# Patient Record
Sex: MALE | Race: WHITE | Employment: OTHER | URBAN - METROPOLITAN AREA
[De-identification: names, ages, dates, MRNs, and addresses within clinical notes are randomized per-mention and may not be internally consistent; named-entity substitution may affect disease eponyms.]

---

## 2017-02-28 ENCOUNTER — OFFICE VISIT (OUTPATIENT)
Dept: INTERNAL MEDICINE CLINIC | Facility: CLINIC | Age: 57
End: 2017-02-28

## 2017-02-28 ENCOUNTER — LAB ENCOUNTER (OUTPATIENT)
Dept: LAB | Age: 57
End: 2017-02-28
Attending: FAMILY MEDICINE
Payer: COMMERCIAL

## 2017-02-28 VITALS
WEIGHT: 178.75 LBS | DIASTOLIC BLOOD PRESSURE: 68 MMHG | BODY MASS INDEX: 26 KG/M2 | OXYGEN SATURATION: 99 % | TEMPERATURE: 98 F | SYSTOLIC BLOOD PRESSURE: 116 MMHG | HEART RATE: 59 BPM

## 2017-02-28 DIAGNOSIS — I10 ESSENTIAL HYPERTENSION, BENIGN: ICD-10-CM

## 2017-02-28 DIAGNOSIS — Z13.29 SCREENING FOR THYROID DISORDER: ICD-10-CM

## 2017-02-28 DIAGNOSIS — Z13.1 SCREENING FOR DIABETES MELLITUS: ICD-10-CM

## 2017-02-28 DIAGNOSIS — E55.9 VITAMIN D DEFICIENCY: ICD-10-CM

## 2017-02-28 DIAGNOSIS — I10 ESSENTIAL HYPERTENSION, BENIGN: Primary | ICD-10-CM

## 2017-02-28 DIAGNOSIS — Z12.5 SPECIAL SCREENING FOR MALIGNANT NEOPLASM OF PROSTATE: ICD-10-CM

## 2017-02-28 DIAGNOSIS — Z13.0 SCREENING FOR IRON DEFICIENCY ANEMIA: ICD-10-CM

## 2017-02-28 LAB
ALBUMIN SERPL-MCNC: 3.8 G/DL (ref 3.5–4.8)
ALP LIVER SERPL-CCNC: 49 U/L (ref 45–117)
ALT SERPL-CCNC: 29 U/L (ref 17–63)
AST SERPL-CCNC: 20 U/L (ref 15–41)
BASOPHILS # BLD AUTO: 0.02 X10(3) UL (ref 0–0.1)
BASOPHILS NFR BLD AUTO: 0.3 %
BILIRUB SERPL-MCNC: 0.5 MG/DL (ref 0.1–2)
BUN BLD-MCNC: 13 MG/DL (ref 8–20)
CALCIUM BLD-MCNC: 9.2 MG/DL (ref 8.3–10.3)
CHLORIDE: 103 MMOL/L (ref 101–111)
CHOLEST SMN-MCNC: 166 MG/DL (ref ?–200)
CO2: 29 MMOL/L (ref 22–32)
CREAT BLD-MCNC: 1.15 MG/DL (ref 0.7–1.3)
EOSINOPHIL # BLD AUTO: 0.07 X10(3) UL (ref 0–0.3)
EOSINOPHIL NFR BLD AUTO: 1.2 %
ERYTHROCYTE [DISTWIDTH] IN BLOOD BY AUTOMATED COUNT: 11.7 % (ref 11.5–16)
GLUCOSE BLD-MCNC: 89 MG/DL (ref 70–99)
HCT VFR BLD AUTO: 42.9 % (ref 37–53)
HDLC SERPL-MCNC: 78 MG/DL (ref 45–?)
HDLC SERPL: 2.13 {RATIO} (ref ?–4.97)
HGB BLD-MCNC: 14.7 G/DL (ref 13–17)
IMMATURE GRANULOCYTE COUNT: 0.01 X10(3) UL (ref 0–1)
IMMATURE GRANULOCYTE RATIO %: 0.2 %
LDLC SERPL CALC-MCNC: 77 MG/DL (ref ?–130)
LYMPHOCYTES # BLD AUTO: 1.57 X10(3) UL (ref 0.9–4)
LYMPHOCYTES NFR BLD AUTO: 27.4 %
M PROTEIN MFR SERPL ELPH: 7.6 G/DL (ref 6.1–8.3)
MCH RBC QN AUTO: 34.3 PG (ref 27–33.2)
MCHC RBC AUTO-ENTMCNC: 34.3 G/DL (ref 31–37)
MCV RBC AUTO: 100 FL (ref 80–99)
MONOCYTES # BLD AUTO: 0.45 X10(3) UL (ref 0.1–0.6)
MONOCYTES NFR BLD AUTO: 7.9 %
NEUTROPHIL ABS PRELIM: 3.61 X10 (3) UL (ref 1.3–6.7)
NEUTROPHILS # BLD AUTO: 3.61 X10(3) UL (ref 1.3–6.7)
NEUTROPHILS NFR BLD AUTO: 63 %
NONHDLC SERPL-MCNC: 88 MG/DL (ref ?–130)
PLATELET # BLD AUTO: 223 10(3)UL (ref 150–450)
POTASSIUM SERPL-SCNC: 4.6 MMOL/L (ref 3.6–5.1)
PSA SERPL-MCNC: 1.85 NG/ML (ref 0.01–4)
RBC # BLD AUTO: 4.29 X10(6)UL (ref 4.3–5.7)
RED CELL DISTRIBUTION WIDTH-SD: 42.5 FL (ref 35.1–46.3)
SODIUM SERPL-SCNC: 138 MMOL/L (ref 136–144)
TRIGLYCERIDES: 54 MG/DL (ref ?–150)
TSI SER-ACNC: 0.52 MIU/ML (ref 0.35–5.5)
VLDL: 11 MG/DL (ref 5–40)
WBC # BLD AUTO: 5.7 X10(3) UL (ref 4–13)

## 2017-02-28 PROCEDURE — 80053 COMPREHEN METABOLIC PANEL: CPT

## 2017-02-28 PROCEDURE — 82306 VITAMIN D 25 HYDROXY: CPT

## 2017-02-28 PROCEDURE — 36415 COLL VENOUS BLD VENIPUNCTURE: CPT

## 2017-02-28 PROCEDURE — 84443 ASSAY THYROID STIM HORMONE: CPT

## 2017-02-28 PROCEDURE — 85025 COMPLETE CBC W/AUTO DIFF WBC: CPT

## 2017-02-28 PROCEDURE — 80061 LIPID PANEL: CPT

## 2017-02-28 PROCEDURE — 83036 HEMOGLOBIN GLYCOSYLATED A1C: CPT

## 2017-02-28 PROCEDURE — 99213 OFFICE O/P EST LOW 20 MIN: CPT | Performed by: FAMILY MEDICINE

## 2017-02-28 PROCEDURE — 84153 ASSAY OF PSA TOTAL: CPT

## 2017-02-28 RX ORDER — AMLODIPINE BESYLATE AND BENAZEPRIL HYDROCHLORIDE 10; 20 MG/1; MG/1
1 CAPSULE ORAL DAILY
Qty: 90 CAPSULE | Refills: 0 | Status: SHIPPED | OUTPATIENT
Start: 2017-02-28 | End: 2017-05-26

## 2017-02-28 NOTE — PROGRESS NOTES
CHIEF COMPLAINT:     Patient presents with:  Blood Pressure: Follow up. Lab: Due for labs. HPI:   Travis Hernandez is a 49year old male   Patient presents for recheck of his hypertension.  Pt has been taking medications as instructed, no medication side e auscultation bilaterally, no wheezes or rhonchi. Breathing is non labored.   CARDIO: RRR without murmur  EXTREMITIES: no cyanosis, clubbing or edema       ASSESSMENT AND PLAN:     Essential hypertension, benign  (primary encounter diagnosis)  Vitamin d defi

## 2017-03-01 LAB
25-HYDROXYVITAMIN D (TOTAL): 37.7 NG/ML (ref 30–100)
EST. AVERAGE GLUCOSE BLD GHB EST-MCNC: 111 MG/DL (ref 68–126)
HBA1C MFR BLD HPLC: 5.5 % (ref ?–5.7)

## 2017-05-26 ENCOUNTER — TELEPHONE (OUTPATIENT)
Dept: INTERNAL MEDICINE CLINIC | Facility: CLINIC | Age: 57
End: 2017-05-26

## 2017-05-26 RX ORDER — AMLODIPINE BESYLATE AND BENAZEPRIL HYDROCHLORIDE 10; 20 MG/1; MG/1
1 CAPSULE ORAL DAILY
Qty: 90 CAPSULE | Refills: 0 | Status: SHIPPED | OUTPATIENT
Start: 2017-05-26 | End: 2017-08-24

## 2017-08-24 RX ORDER — AMLODIPINE BESYLATE AND BENAZEPRIL HYDROCHLORIDE 10; 20 MG/1; MG/1
1 CAPSULE ORAL DAILY
Qty: 30 CAPSULE | Refills: 0 | Status: SHIPPED | OUTPATIENT
Start: 2017-08-24 | End: 2017-11-08

## 2017-11-08 ENCOUNTER — OFFICE VISIT (OUTPATIENT)
Dept: FAMILY MEDICINE CLINIC | Facility: CLINIC | Age: 57
End: 2017-11-08

## 2017-11-08 VITALS
SYSTOLIC BLOOD PRESSURE: 136 MMHG | BODY MASS INDEX: 26.07 KG/M2 | HEART RATE: 77 BPM | HEIGHT: 69 IN | OXYGEN SATURATION: 98 % | DIASTOLIC BLOOD PRESSURE: 62 MMHG | TEMPERATURE: 98 F | WEIGHT: 176 LBS | RESPIRATION RATE: 16 BRPM

## 2017-11-08 DIAGNOSIS — I10 ESSENTIAL HYPERTENSION: Primary | ICD-10-CM

## 2017-11-08 PROCEDURE — 99213 OFFICE O/P EST LOW 20 MIN: CPT | Performed by: FAMILY MEDICINE

## 2017-11-08 RX ORDER — AMLODIPINE BESYLATE AND BENAZEPRIL HYDROCHLORIDE 10; 20 MG/1; MG/1
1 CAPSULE ORAL DAILY
Qty: 90 CAPSULE | Refills: 1 | Status: SHIPPED | OUTPATIENT
Start: 2017-11-08 | End: 2018-04-23

## 2017-11-08 NOTE — PROGRESS NOTES
HPI:    Patient ID: Eliseo Sawant is a 56year old male. Hypertension   This is a chronic problem. The current episode started more than 1 year ago. The problem is unchanged. The problem is controlled.  Pertinent negatives include no anxiety, blurred vision encounter. Meds This Visit:  Signed Prescriptions Disp Refills    Amlodipine Besy-Benazepril HCl 10-20 MG Oral Cap 90 capsule 1      Sig: Take 1 capsule by mouth daily.            Imaging & Referrals:  None       #[id number]

## 2018-04-23 DIAGNOSIS — I10 ESSENTIAL HYPERTENSION: ICD-10-CM

## 2018-04-23 NOTE — TELEPHONE ENCOUNTER
Last ov was 11/8/17  Last refill amlodipine 11/8/17    .   Kidney:    Lab Results  Component Value Date   BUN 13 02/28/2017   BUN 15 12/02/2015   BUN 15 10/08/2014     Lab Results  Component Value Date   CREATSERUM 1.15 02/28/2017   CREATSERUM 1.10 12/02/20

## 2018-04-25 RX ORDER — AMLODIPINE BESYLATE AND BENAZEPRIL HYDROCHLORIDE 10; 20 MG/1; MG/1
1 CAPSULE ORAL DAILY
Qty: 90 CAPSULE | Refills: 0 | Status: SHIPPED | OUTPATIENT
Start: 2018-04-25 | End: 2018-07-23

## 2018-07-23 DIAGNOSIS — I10 ESSENTIAL HYPERTENSION: ICD-10-CM

## 2018-07-24 RX ORDER — AMLODIPINE BESYLATE AND BENAZEPRIL HYDROCHLORIDE 10; 20 MG/1; MG/1
1 CAPSULE ORAL DAILY
Qty: 90 CAPSULE | Refills: 0 | Status: SHIPPED | OUTPATIENT
Start: 2018-07-24 | End: 2018-10-24

## 2018-10-24 ENCOUNTER — OFFICE VISIT (OUTPATIENT)
Dept: FAMILY MEDICINE CLINIC | Facility: CLINIC | Age: 58
End: 2018-10-24
Payer: COMMERCIAL

## 2018-10-24 ENCOUNTER — LAB ENCOUNTER (OUTPATIENT)
Dept: LAB | Age: 58
End: 2018-10-24
Attending: FAMILY MEDICINE
Payer: COMMERCIAL

## 2018-10-24 VITALS
OXYGEN SATURATION: 98 % | HEART RATE: 66 BPM | RESPIRATION RATE: 20 BRPM | WEIGHT: 178 LBS | HEIGHT: 69 IN | TEMPERATURE: 98 F | BODY MASS INDEX: 26.36 KG/M2 | SYSTOLIC BLOOD PRESSURE: 122 MMHG | DIASTOLIC BLOOD PRESSURE: 82 MMHG

## 2018-10-24 DIAGNOSIS — I10 ESSENTIAL HYPERTENSION: ICD-10-CM

## 2018-10-24 DIAGNOSIS — Z00.00 ANNUAL PHYSICAL EXAM: Primary | ICD-10-CM

## 2018-10-24 DIAGNOSIS — Z23 NEED FOR TDAP VACCINATION: ICD-10-CM

## 2018-10-24 DIAGNOSIS — Z00.00 ANNUAL PHYSICAL EXAM: ICD-10-CM

## 2018-10-24 PROCEDURE — 36415 COLL VENOUS BLD VENIPUNCTURE: CPT | Performed by: FAMILY MEDICINE

## 2018-10-24 PROCEDURE — 80050 GENERAL HEALTH PANEL: CPT | Performed by: FAMILY MEDICINE

## 2018-10-24 PROCEDURE — 99396 PREV VISIT EST AGE 40-64: CPT | Performed by: FAMILY MEDICINE

## 2018-10-24 PROCEDURE — 80061 LIPID PANEL: CPT | Performed by: FAMILY MEDICINE

## 2018-10-24 PROCEDURE — 90715 TDAP VACCINE 7 YRS/> IM: CPT | Performed by: FAMILY MEDICINE

## 2018-10-24 PROCEDURE — 82306 VITAMIN D 25 HYDROXY: CPT | Performed by: FAMILY MEDICINE

## 2018-10-24 PROCEDURE — 84153 ASSAY OF PSA TOTAL: CPT | Performed by: FAMILY MEDICINE

## 2018-10-24 PROCEDURE — 90471 IMMUNIZATION ADMIN: CPT | Performed by: FAMILY MEDICINE

## 2018-10-24 RX ORDER — AMLODIPINE BESYLATE AND BENAZEPRIL HYDROCHLORIDE 10; 20 MG/1; MG/1
1 CAPSULE ORAL DAILY
Qty: 90 CAPSULE | Refills: 3 | Status: SHIPPED | OUTPATIENT
Start: 2018-10-24 | End: 2019-05-01

## 2018-10-24 NOTE — H&P
Suman Santana is a 66year old male who presents for a complete physical exam.   HPI:   Pt complains of nothing and requested a refill on his meds  Colonoscopy: 10/21/15    Current Outpatient Medications:  AMLODIPINE BESY-BENAZEPRIL HCL 10-20 MG Oral Cap TA kg/m²       Body mass index is 26.29 kg/m².      GENERAL: well developed, well nourished,in no apparent distress  SKIN: no rashes,no suspicious lesions  HEENT: atraumatic, normocephalic,TMs clear, posterior pharynx clear  EYES: PERRLA,conjunctiva clear  NEC REFLEX TO FREE T4; Future    2. Need for Tdap vaccination  - TETANUS, DIPHTHERIA TOXOIDS AND ACELLULAR PERTUSIS VACCINE (TDAP), >7 YEARS, IM USE    3. Essential hypertension  - Amlodipine Besy-Benazepril HCl 10-20 MG Oral Cap;  Take 1 capsule by mouth daily

## 2019-05-01 ENCOUNTER — OFFICE VISIT (OUTPATIENT)
Dept: FAMILY MEDICINE CLINIC | Facility: CLINIC | Age: 59
End: 2019-05-01
Payer: COMMERCIAL

## 2019-05-01 ENCOUNTER — APPOINTMENT (OUTPATIENT)
Dept: LAB | Age: 59
End: 2019-05-01
Attending: FAMILY MEDICINE
Payer: COMMERCIAL

## 2019-05-01 VITALS
HEART RATE: 67 BPM | RESPIRATION RATE: 20 BRPM | OXYGEN SATURATION: 98 % | TEMPERATURE: 98 F | BODY MASS INDEX: 23.43 KG/M2 | SYSTOLIC BLOOD PRESSURE: 116 MMHG | DIASTOLIC BLOOD PRESSURE: 68 MMHG | WEIGHT: 160 LBS | HEIGHT: 69.25 IN

## 2019-05-01 DIAGNOSIS — I10 ESSENTIAL HYPERTENSION: Primary | ICD-10-CM

## 2019-05-01 DIAGNOSIS — I10 ESSENTIAL HYPERTENSION: ICD-10-CM

## 2019-05-01 DIAGNOSIS — E78.2 MIXED HYPERLIPIDEMIA: ICD-10-CM

## 2019-05-01 PROCEDURE — 99213 OFFICE O/P EST LOW 20 MIN: CPT | Performed by: FAMILY MEDICINE

## 2019-05-01 PROCEDURE — 36415 COLL VENOUS BLD VENIPUNCTURE: CPT | Performed by: FAMILY MEDICINE

## 2019-05-01 PROCEDURE — 93005 ELECTROCARDIOGRAM TRACING: CPT

## 2019-05-01 PROCEDURE — 93010 ELECTROCARDIOGRAM REPORT: CPT | Performed by: INTERNAL MEDICINE

## 2019-05-01 PROCEDURE — 80061 LIPID PANEL: CPT | Performed by: FAMILY MEDICINE

## 2019-05-01 RX ORDER — AMLODIPINE BESYLATE AND BENAZEPRIL HYDROCHLORIDE 5; 20 MG/1; MG/1
1 CAPSULE ORAL DAILY
Qty: 90 CAPSULE | Refills: 1 | Status: SHIPPED | OUTPATIENT
Start: 2019-05-01 | End: 2019-10-11

## 2019-05-01 NOTE — PROGRESS NOTES
HPI:    Patient ID: Brett Palacios is a 66year old male. Hypertension   This is a chronic problem. The current episode started more than 1 year ago. The problem has been gradually improving since onset. The problem is controlled.  Pertinent negatives include No murmur heard. Pulmonary/Chest: Effort normal and breath sounds normal. No respiratory distress. He has no wheezes. He has no rales. Skin: He is not diaphoretic. Vitals reviewed.              ASSESSMENT/PLAN:   Essential hypertension  (primary enco

## 2019-09-20 ENCOUNTER — HOSPITAL ENCOUNTER (OUTPATIENT)
Dept: CT IMAGING | Facility: HOSPITAL | Age: 59
Discharge: HOME | End: 2019-09-20
Attending: FAMILY MEDICINE

## 2019-09-20 DIAGNOSIS — Z13.6 SCREENING FOR CARDIOVASCULAR CONDITION: ICD-10-CM

## 2019-09-20 NOTE — PROGRESS NOTES
Pt. Seen for Heart Scan. Preliminary Score: 137.33    BP: 120/76  Recently decreased BP meds due to losing weight.     Cholestec lab results: Fasting  TC: 191  HDL: 90  LDL: 88  T  Glucose: 91    Preliminary results and risk factors reviewed with pt

## 2019-10-11 RX ORDER — AMLODIPINE BESYLATE AND BENAZEPRIL HYDROCHLORIDE 5; 20 MG/1; MG/1
1 CAPSULE ORAL DAILY
Qty: 90 CAPSULE | Refills: 0 | Status: SHIPPED | OUTPATIENT
Start: 2019-10-11 | End: 2020-01-13

## 2020-01-13 RX ORDER — AMLODIPINE BESYLATE AND BENAZEPRIL HYDROCHLORIDE 5; 20 MG/1; MG/1
1 CAPSULE ORAL DAILY
Qty: 90 CAPSULE | Refills: 0 | Status: SHIPPED | OUTPATIENT
Start: 2020-01-13 | End: 2020-04-11

## 2020-04-11 RX ORDER — AMLODIPINE BESYLATE AND BENAZEPRIL HYDROCHLORIDE 5; 20 MG/1; MG/1
1 CAPSULE ORAL DAILY
Qty: 30 CAPSULE | Refills: 0 | Status: SHIPPED | OUTPATIENT
Start: 2020-04-11 | End: 2020-06-16

## 2020-04-11 NOTE — TELEPHONE ENCOUNTER
Med refilled. Pt overdue for blood work and visit. Please schedule OV with Dr. Olivia Goodrich in 2 months.

## 2020-06-16 ENCOUNTER — OFFICE VISIT (OUTPATIENT)
Dept: FAMILY MEDICINE CLINIC | Facility: CLINIC | Age: 60
End: 2020-06-16
Payer: COMMERCIAL

## 2020-06-16 ENCOUNTER — LAB ENCOUNTER (OUTPATIENT)
Dept: LAB | Age: 60
End: 2020-06-16
Attending: FAMILY MEDICINE
Payer: COMMERCIAL

## 2020-06-16 VITALS
SYSTOLIC BLOOD PRESSURE: 124 MMHG | DIASTOLIC BLOOD PRESSURE: 76 MMHG | BODY MASS INDEX: 24.16 KG/M2 | RESPIRATION RATE: 16 BRPM | HEIGHT: 69.25 IN | TEMPERATURE: 98 F | HEART RATE: 78 BPM | OXYGEN SATURATION: 97 % | WEIGHT: 165 LBS

## 2020-06-16 DIAGNOSIS — I10 ESSENTIAL HYPERTENSION: ICD-10-CM

## 2020-06-16 DIAGNOSIS — R97.20 ELEVATED PSA: ICD-10-CM

## 2020-06-16 DIAGNOSIS — Z00.00 ANNUAL PHYSICAL EXAM: Primary | ICD-10-CM

## 2020-06-16 DIAGNOSIS — Z00.00 ANNUAL PHYSICAL EXAM: ICD-10-CM

## 2020-06-16 DIAGNOSIS — E55.9 VITAMIN D DEFICIENCY: ICD-10-CM

## 2020-06-16 PROCEDURE — 99396 PREV VISIT EST AGE 40-64: CPT | Performed by: FAMILY MEDICINE

## 2020-06-16 PROCEDURE — 84153 ASSAY OF PSA TOTAL: CPT | Performed by: FAMILY MEDICINE

## 2020-06-16 PROCEDURE — 36415 COLL VENOUS BLD VENIPUNCTURE: CPT | Performed by: FAMILY MEDICINE

## 2020-06-16 PROCEDURE — 99212 OFFICE O/P EST SF 10 MIN: CPT | Performed by: FAMILY MEDICINE

## 2020-06-16 PROCEDURE — 80061 LIPID PANEL: CPT | Performed by: FAMILY MEDICINE

## 2020-06-16 PROCEDURE — 80050 GENERAL HEALTH PANEL: CPT | Performed by: FAMILY MEDICINE

## 2020-06-16 PROCEDURE — 82306 VITAMIN D 25 HYDROXY: CPT | Performed by: FAMILY MEDICINE

## 2020-06-16 RX ORDER — AMLODIPINE BESYLATE AND BENAZEPRIL HYDROCHLORIDE 5; 20 MG/1; MG/1
1 CAPSULE ORAL DAILY
Qty: 90 CAPSULE | Refills: 1 | Status: SHIPPED | OUTPATIENT
Start: 2020-06-16 | End: 2020-12-08

## 2020-06-18 ENCOUNTER — PATIENT MESSAGE (OUTPATIENT)
Dept: FAMILY MEDICINE CLINIC | Facility: CLINIC | Age: 60
End: 2020-06-18

## 2020-06-18 DIAGNOSIS — R97.20 ELEVATED PSA: Primary | ICD-10-CM

## 2020-06-18 NOTE — TELEPHONE ENCOUNTER
From: Kamini Has  To: Russel Savage DO  Sent: 6/18/2020 12:43 PM CDT  Subject: Test Results Question    Hi Dr Clifton Martin,  Please order a PSA screen blood test in 3 months.  If the results remain high I will make an appointment to see the urologist.  Thanks,

## 2020-06-18 NOTE — TELEPHONE ENCOUNTER
Dr. Cherylene League, patient requesting PSA in 3 months to be ordered under you, then patient will see urology. Do you want to order for 3 months?

## 2020-06-27 NOTE — H&P
Og Tucker is a 64year old male who presents for a complete physical exam.   HPI:   Pt complains of chronic htn, stable. No CP/SOB/HA. Recent PSA was elevated. .    Colonoscopy: done in 2015 due for it this year  Current Outpatient Medications   Medi 97%   BMI 24.19 kg/m²       Body mass index is 24.19 kg/m².      GENERAL: well developed, well nourished,in no apparent distress  SKIN: no rashes,no suspicious lesions  HEENT: atraumatic, normocephalic,TMs clear, posterior pharynx clear  EYES: PERRLA,conjun Standing Expiration Date: 6/16/2021      PSA (Screening) [E]          Standing Status: Future          Number of Occurrences: 1          Standing Expiration Date: 6/16/2021      Vitamin D, 25-Hydroxy [E]          Standing Status: Future          Num

## 2020-10-07 ENCOUNTER — LABORATORY ENCOUNTER (OUTPATIENT)
Dept: LAB | Age: 60
End: 2020-10-07
Attending: FAMILY MEDICINE
Payer: COMMERCIAL

## 2020-10-07 DIAGNOSIS — R97.20 ELEVATED PSA: ICD-10-CM

## 2020-10-07 PROCEDURE — 36415 COLL VENOUS BLD VENIPUNCTURE: CPT | Performed by: FAMILY MEDICINE

## 2020-10-07 PROCEDURE — 84153 ASSAY OF PSA TOTAL: CPT | Performed by: FAMILY MEDICINE

## 2020-10-19 ENCOUNTER — APPOINTMENT (OUTPATIENT)
Dept: LAB | Facility: HOSPITAL | Age: 60
End: 2020-10-19
Attending: INTERNAL MEDICINE
Payer: COMMERCIAL

## 2020-10-19 DIAGNOSIS — Z01.818 PRE-OP TESTING: ICD-10-CM

## 2020-10-21 PROBLEM — Z86.0101 HISTORY OF ADENOMATOUS POLYP OF COLON: Status: ACTIVE | Noted: 2020-10-21

## 2020-10-21 PROBLEM — D12.3 BENIGN NEOPLASM OF TRANSVERSE COLON: Status: ACTIVE | Noted: 2020-10-21

## 2020-10-21 PROBLEM — Z86.010 HISTORY OF ADENOMATOUS POLYP OF COLON: Status: ACTIVE | Noted: 2020-10-21

## 2020-12-08 RX ORDER — AMLODIPINE BESYLATE AND BENAZEPRIL HYDROCHLORIDE 5; 20 MG/1; MG/1
1 CAPSULE ORAL DAILY
Qty: 90 CAPSULE | Refills: 1 | Status: SHIPPED | OUTPATIENT
Start: 2020-12-08 | End: 2021-06-04

## 2021-06-04 RX ORDER — AMLODIPINE BESYLATE AND BENAZEPRIL HYDROCHLORIDE 5; 20 MG/1; MG/1
1 CAPSULE ORAL DAILY
Qty: 90 CAPSULE | Refills: 0 | Status: SHIPPED | OUTPATIENT
Start: 2021-06-04 | End: 2021-09-03

## 2021-09-03 RX ORDER — AMLODIPINE BESYLATE AND BENAZEPRIL HYDROCHLORIDE 5; 20 MG/1; MG/1
1 CAPSULE ORAL DAILY
Qty: 90 CAPSULE | Refills: 0 | OUTPATIENT
Start: 2021-09-03

## 2021-09-03 RX ORDER — AMLODIPINE BESYLATE AND BENAZEPRIL HYDROCHLORIDE 5; 20 MG/1; MG/1
1 CAPSULE ORAL DAILY
Qty: 90 CAPSULE | Refills: 0 | Status: SHIPPED | OUTPATIENT
Start: 2021-09-03 | End: 2021-10-06

## 2021-09-03 NOTE — TELEPHONE ENCOUNTER
LR-  6/4/21      LOV 6/8/20        Sending pt a my chart message about coming in for yearly physical

## 2021-09-09 ENCOUNTER — OFFICE VISIT (OUTPATIENT)
Dept: FAMILY MEDICINE CLINIC | Facility: CLINIC | Age: 61
End: 2021-09-09
Payer: COMMERCIAL

## 2021-09-09 VITALS
OXYGEN SATURATION: 100 % | SYSTOLIC BLOOD PRESSURE: 136 MMHG | RESPIRATION RATE: 14 BRPM | HEART RATE: 68 BPM | BODY MASS INDEX: 25.48 KG/M2 | DIASTOLIC BLOOD PRESSURE: 75 MMHG | TEMPERATURE: 98 F | HEIGHT: 69 IN | WEIGHT: 172 LBS

## 2021-09-09 DIAGNOSIS — Z11.52 ENCOUNTER FOR SCREENING FOR COVID-19: Primary | ICD-10-CM

## 2021-09-09 PROCEDURE — 3008F BODY MASS INDEX DOCD: CPT | Performed by: PHYSICIAN ASSISTANT

## 2021-09-09 PROCEDURE — 99212 OFFICE O/P EST SF 10 MIN: CPT | Performed by: PHYSICIAN ASSISTANT

## 2021-09-09 PROCEDURE — 3075F SYST BP GE 130 - 139MM HG: CPT | Performed by: PHYSICIAN ASSISTANT

## 2021-09-09 PROCEDURE — 3078F DIAST BP <80 MM HG: CPT | Performed by: PHYSICIAN ASSISTANT

## 2021-09-09 NOTE — PATIENT INSTRUCTIONS
Coronavirus Disease 2019 (COVID-19)     [facility] is committed to the safety and well-being of our patients, members, employees, and communities.  As concerns arise about the new strain of coronavirus that causes COVID-19, [facility] exposure  • After day 7 from date of last exposure with a negative test result (test must occur on day 5 or later)  After stopping quarantine, you should  • Watch for symptoms until 14 days after exposure.   • If you have symptoms, immediately self-isolate Care     If you are awaiting test results or are confirmed positive for COVID -19, and your symptoms worsen at home with symptoms such as: extreme weakness, difficult breathing, or unrelenting fevers greater than 100.4 degrees Fahrenheit, you should contac Follow-up  If you are diagnosed with COVID, refrain from exercise until approved by your primary care provider. Please call your primary care provider within 2 days of your discharge to arrange for a telehealth follow-up.  CDC does not recommend repeat test Control & Prevention (CDC)  10 things you can do to manage your health at home, Beat[employer].[email or web address]. pdf  [email or web address] Retrieved March 17, 2021, from [email or web address]-information/[email or web address]-19-long-haulers. html  Long-term effects of covid-19. (n.d.).  Retrieved May 11, 2021, from [email or web address]

## 2021-09-09 NOTE — PROGRESS NOTES
CHIEF COMPLAINT:   Patient presents with:  Testing        HPI:   Lakesha Alves is a 64year old male who presents for covid testing- going on a cruise to [place].  No symptoms, feels well         Current Outpatient Medications   Medication Sig Disp LYMPH: No cervical or supraclavicular lymphadenopathy.        ASSESSMENT AND PLAN:     Cynthia Maya is a 64year old male who presents with:     ASSESSMENT:  Encounter for screening for covid-19  (primary encounter diagnosis)  CDC guidelines discussed health authorities make the final decisions about how long quarantine should last, based on local conditions and needs. Follow the recommendations of your local public health department if you need to quarantine.  Options they will consider include stopping need to be around other people in or outside of the home, wear a facemask. 9. Avoid sharing personal items with other people in your household, like dishes, towels, and bedding   10.  Clean all surfaces that are touched often, like counters, tabletops, an getting better, whichever is longer. Patients with pending COVID-19 test results should follow all care and home isolation instructions. Your test results will be called to you from an [facility] representative.  If you have not received a call withi [facility] COVID-19 Nurse Triage Line at [phone].     Additional Information      You can also get more information at the following websites:   Centers for Disease Control & Prevention (CDC)  What to do if you are sick with coronavirus disease 20 Important ways to slow the spread of COVID 19 are:   • Get the COVID 19 vaccine and encourage others to get the COVID 19 vaccine   • Wear a mask in public  • Avoid large gatherings  • Wash your hands frequently  • Stay at least 6 feet away from other peopl

## 2021-09-10 LAB — SARS-COV-2 RNA RESP QL NAA+PROBE: NOT DETECTED

## 2021-10-06 ENCOUNTER — OFFICE VISIT (OUTPATIENT)
Dept: FAMILY MEDICINE CLINIC | Facility: CLINIC | Age: 61
End: 2021-10-06
Payer: COMMERCIAL

## 2021-10-06 ENCOUNTER — LAB ENCOUNTER (OUTPATIENT)
Dept: LAB | Age: 61
End: 2021-10-06
Attending: FAMILY MEDICINE
Payer: COMMERCIAL

## 2021-10-06 VITALS
DIASTOLIC BLOOD PRESSURE: 82 MMHG | WEIGHT: 169.38 LBS | OXYGEN SATURATION: 98 % | SYSTOLIC BLOOD PRESSURE: 120 MMHG | HEIGHT: 69 IN | BODY MASS INDEX: 25.09 KG/M2 | HEART RATE: 75 BPM | RESPIRATION RATE: 16 BRPM

## 2021-10-06 DIAGNOSIS — Z13.0 SCREENING FOR DEFICIENCY ANEMIA: ICD-10-CM

## 2021-10-06 DIAGNOSIS — Z13.220 LIPID SCREENING: ICD-10-CM

## 2021-10-06 DIAGNOSIS — Z00.00 WELLNESS EXAMINATION: Primary | ICD-10-CM

## 2021-10-06 DIAGNOSIS — Z00.00 WELLNESS EXAMINATION: ICD-10-CM

## 2021-10-06 DIAGNOSIS — Z12.5 PROSTATE CANCER SCREENING: ICD-10-CM

## 2021-10-06 DIAGNOSIS — I10 ESSENTIAL HYPERTENSION, BENIGN: ICD-10-CM

## 2021-10-06 DIAGNOSIS — Z23 NEED FOR SHINGLES VACCINE: ICD-10-CM

## 2021-10-06 PROCEDURE — 84153 ASSAY OF PSA TOTAL: CPT | Performed by: FAMILY MEDICINE

## 2021-10-06 PROCEDURE — 3008F BODY MASS INDEX DOCD: CPT | Performed by: FAMILY MEDICINE

## 2021-10-06 PROCEDURE — 90750 HZV VACC RECOMBINANT IM: CPT | Performed by: FAMILY MEDICINE

## 2021-10-06 PROCEDURE — 80053 COMPREHEN METABOLIC PANEL: CPT | Performed by: FAMILY MEDICINE

## 2021-10-06 PROCEDURE — 99396 PREV VISIT EST AGE 40-64: CPT | Performed by: FAMILY MEDICINE

## 2021-10-06 PROCEDURE — 85025 COMPLETE CBC W/AUTO DIFF WBC: CPT | Performed by: FAMILY MEDICINE

## 2021-10-06 PROCEDURE — 90471 IMMUNIZATION ADMIN: CPT | Performed by: FAMILY MEDICINE

## 2021-10-06 PROCEDURE — 3079F DIAST BP 80-89 MM HG: CPT | Performed by: FAMILY MEDICINE

## 2021-10-06 PROCEDURE — 80061 LIPID PANEL: CPT | Performed by: FAMILY MEDICINE

## 2021-10-06 PROCEDURE — 3074F SYST BP LT 130 MM HG: CPT | Performed by: FAMILY MEDICINE

## 2021-10-06 RX ORDER — CHOLECALCIFEROL (VITAMIN D3) 125 MCG
CAPSULE ORAL
COMMUNITY
Start: 2020-10-01

## 2021-10-06 RX ORDER — MULTIVIT WITH MINERALS/LUTEIN
TABLET ORAL
COMMUNITY

## 2021-10-06 RX ORDER — AMLODIPINE BESYLATE AND BENAZEPRIL HYDROCHLORIDE 5; 20 MG/1; MG/1
1 CAPSULE ORAL DAILY
Qty: 90 CAPSULE | Refills: 3 | Status: SHIPPED | OUTPATIENT
Start: 2021-10-06

## 2021-10-06 NOTE — PROGRESS NOTES
HPI:   Lakesha Alves is a 63year old male who presents for an Annual Health Visit. 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         Allergies:     Codeine                 RASH    Com (76.8 kg)   SpO2 98%   BMI 25.02 kg/m²    Wt Readings from Last 6 Encounters:  10/06/21 : 169 lb 6.4 oz (76.8 kg)  09/09/21 : 172 lb (78 kg)  10/16/20 : 165 lb (74.8 kg)  06/16/20 : 165 lb (74.8 kg)  05/01/19 : 160 lb (72.6 kg)  10/24/18 : 178 lb (80.7 kg) VACC RECOMBINANT IM NJX      Overall doing well, will get screening labs and get shingles vaccine started. There are no Patient Instructions on file for this visit. The patient indicates understanding of these issues and agrees to the plan.     Proble

## 2022-10-11 ENCOUNTER — ORDER TRANSCRIPTION (OUTPATIENT)
Dept: ADMINISTRATIVE | Facility: HOSPITAL | Age: 62
End: 2022-10-11

## 2022-10-11 DIAGNOSIS — Z13.6 SCREENING FOR CARDIOVASCULAR CONDITION: Primary | ICD-10-CM

## 2022-10-19 ENCOUNTER — LAB ENCOUNTER (OUTPATIENT)
Dept: LAB | Age: 62
End: 2022-10-19
Attending: FAMILY MEDICINE
Payer: COMMERCIAL

## 2022-10-19 ENCOUNTER — OFFICE VISIT (OUTPATIENT)
Dept: FAMILY MEDICINE CLINIC | Facility: CLINIC | Age: 62
End: 2022-10-19
Payer: COMMERCIAL

## 2022-10-19 ENCOUNTER — HOSPITAL ENCOUNTER (OUTPATIENT)
Dept: CT IMAGING | Age: 62
Discharge: HOME | End: 2022-10-19
Attending: FAMILY MEDICINE

## 2022-10-19 VITALS
SYSTOLIC BLOOD PRESSURE: 108 MMHG | OXYGEN SATURATION: 99 % | WEIGHT: 160 LBS | HEIGHT: 69.33 IN | RESPIRATION RATE: 16 BRPM | HEART RATE: 66 BPM | BODY MASS INDEX: 23.43 KG/M2 | DIASTOLIC BLOOD PRESSURE: 68 MMHG

## 2022-10-19 DIAGNOSIS — Z12.5 PROSTATE CANCER SCREENING: ICD-10-CM

## 2022-10-19 DIAGNOSIS — Z13.220 LIPID SCREENING: ICD-10-CM

## 2022-10-19 DIAGNOSIS — Z13.0 SCREENING FOR DEFICIENCY ANEMIA: ICD-10-CM

## 2022-10-19 DIAGNOSIS — Z13.6 SCREENING FOR CARDIOVASCULAR CONDITION: ICD-10-CM

## 2022-10-19 DIAGNOSIS — Z00.00 WELLNESS EXAMINATION: Primary | ICD-10-CM

## 2022-10-19 DIAGNOSIS — Z00.00 WELLNESS EXAMINATION: ICD-10-CM

## 2022-10-19 DIAGNOSIS — I10 ESSENTIAL HYPERTENSION, BENIGN: ICD-10-CM

## 2022-10-19 LAB
ALBUMIN SERPL-MCNC: 3.3 G/DL (ref 3.4–5)
ALBUMIN/GLOB SERPL: 1 {RATIO} (ref 1–2)
ALP LIVER SERPL-CCNC: 45 U/L
ALT SERPL-CCNC: 23 U/L
ANION GAP SERPL CALC-SCNC: 3 MMOL/L (ref 0–18)
AST SERPL-CCNC: 13 U/L (ref 15–37)
BASOPHILS # BLD AUTO: 0.03 X10(3) UL (ref 0–0.2)
BASOPHILS NFR BLD AUTO: 0.6 %
BILIRUB SERPL-MCNC: 0.4 MG/DL (ref 0.1–2)
BUN BLD-MCNC: 16 MG/DL (ref 7–18)
CALCIUM BLD-MCNC: 8.7 MG/DL (ref 8.5–10.1)
CHLORIDE SERPL-SCNC: 110 MMOL/L (ref 98–112)
CHOLEST SERPL-MCNC: 203 MG/DL (ref ?–200)
CO2 SERPL-SCNC: 29 MMOL/L (ref 21–32)
COMPLEXED PSA SERPL-MCNC: 5.5 NG/ML (ref ?–4)
CREAT BLD-MCNC: 0.97 MG/DL
EOSINOPHIL # BLD AUTO: 0.11 X10(3) UL (ref 0–0.7)
EOSINOPHIL NFR BLD AUTO: 2.3 %
ERYTHROCYTE [DISTWIDTH] IN BLOOD BY AUTOMATED COUNT: 11.9 %
FASTING PATIENT LIPID ANSWER: YES
FASTING STATUS PATIENT QL REPORTED: YES
GFR SERPLBLD BASED ON 1.73 SQ M-ARVRAT: 89 ML/MIN/1.73M2 (ref 60–?)
GLOBULIN PLAS-MCNC: 3.4 G/DL (ref 2.8–4.4)
GLUCOSE BLD-MCNC: 101 MG/DL (ref 70–99)
HCT VFR BLD AUTO: 40 %
HDLC SERPL-MCNC: 77 MG/DL (ref 40–59)
HGB BLD-MCNC: 13.6 G/DL
IMM GRANULOCYTES # BLD AUTO: 0.01 X10(3) UL (ref 0–1)
IMM GRANULOCYTES NFR BLD: 0.2 %
LDLC SERPL CALC-MCNC: 111 MG/DL (ref ?–100)
LYMPHOCYTES # BLD AUTO: 1.43 X10(3) UL (ref 1–4)
LYMPHOCYTES NFR BLD AUTO: 30.2 %
MCH RBC QN AUTO: 34.6 PG (ref 26–34)
MCHC RBC AUTO-ENTMCNC: 34 G/DL (ref 31–37)
MCV RBC AUTO: 101.8 FL
MONOCYTES # BLD AUTO: 0.43 X10(3) UL (ref 0.1–1)
MONOCYTES NFR BLD AUTO: 9.1 %
NEUTROPHILS # BLD AUTO: 2.72 X10 (3) UL (ref 1.5–7.7)
NEUTROPHILS # BLD AUTO: 2.72 X10(3) UL (ref 1.5–7.7)
NEUTROPHILS NFR BLD AUTO: 57.6 %
NONHDLC SERPL-MCNC: 126 MG/DL (ref ?–130)
OSMOLALITY SERPL CALC.SUM OF ELEC: 295 MOSM/KG (ref 275–295)
PLATELET # BLD AUTO: 230 10(3)UL (ref 150–450)
POTASSIUM SERPL-SCNC: 4.7 MMOL/L (ref 3.5–5.1)
PROT SERPL-MCNC: 6.7 G/DL (ref 6.4–8.2)
RBC # BLD AUTO: 3.93 X10(6)UL
SODIUM SERPL-SCNC: 142 MMOL/L (ref 136–145)
TRIGL SERPL-MCNC: 85 MG/DL (ref 30–149)
VLDLC SERPL CALC-MCNC: 14 MG/DL (ref 0–30)
WBC # BLD AUTO: 4.7 X10(3) UL (ref 4–11)

## 2022-10-19 PROCEDURE — 3078F DIAST BP <80 MM HG: CPT | Performed by: FAMILY MEDICINE

## 2022-10-19 PROCEDURE — 85025 COMPLETE CBC W/AUTO DIFF WBC: CPT

## 2022-10-19 PROCEDURE — 99396 PREV VISIT EST AGE 40-64: CPT | Performed by: FAMILY MEDICINE

## 2022-10-19 PROCEDURE — 3008F BODY MASS INDEX DOCD: CPT | Performed by: FAMILY MEDICINE

## 2022-10-19 PROCEDURE — 80061 LIPID PANEL: CPT

## 2022-10-19 PROCEDURE — 36415 COLL VENOUS BLD VENIPUNCTURE: CPT

## 2022-10-19 PROCEDURE — 3074F SYST BP LT 130 MM HG: CPT | Performed by: FAMILY MEDICINE

## 2022-10-19 PROCEDURE — 80053 COMPREHEN METABOLIC PANEL: CPT

## 2022-10-19 RX ORDER — AMLODIPINE BESYLATE AND BENAZEPRIL HYDROCHLORIDE 5; 20 MG/1; MG/1
1 CAPSULE ORAL DAILY
Qty: 90 CAPSULE | Refills: 3 | Status: SHIPPED | OUTPATIENT
Start: 2022-10-19

## 2022-10-27 DIAGNOSIS — R97.20 ELEVATED PSA: Primary | ICD-10-CM

## 2023-02-01 ENCOUNTER — LAB ENCOUNTER (OUTPATIENT)
Dept: LAB | Age: 63
End: 2023-02-01
Attending: FAMILY MEDICINE
Payer: COMMERCIAL

## 2023-02-01 DIAGNOSIS — E78.5 HYPERLIPIDEMIA, UNSPECIFIED HYPERLIPIDEMIA TYPE: ICD-10-CM

## 2023-02-01 DIAGNOSIS — R97.20 ELEVATED PSA: ICD-10-CM

## 2023-02-01 LAB
ALBUMIN SERPL-MCNC: 3.6 G/DL (ref 3.4–5)
ALBUMIN/GLOB SERPL: 1 {RATIO} (ref 1–2)
ALP LIVER SERPL-CCNC: 44 U/L
ALT SERPL-CCNC: 30 U/L
ANION GAP SERPL CALC-SCNC: 2 MMOL/L (ref 0–18)
AST SERPL-CCNC: 18 U/L (ref 15–37)
BILIRUB SERPL-MCNC: 0.5 MG/DL (ref 0.1–2)
BUN BLD-MCNC: 22 MG/DL (ref 7–18)
CALCIUM BLD-MCNC: 9.1 MG/DL (ref 8.5–10.1)
CHLORIDE SERPL-SCNC: 107 MMOL/L (ref 98–112)
CHOLEST SERPL-MCNC: 135 MG/DL (ref ?–200)
CO2 SERPL-SCNC: 31 MMOL/L (ref 21–32)
CREAT BLD-MCNC: 1.28 MG/DL
FASTING PATIENT LIPID ANSWER: YES
FASTING STATUS PATIENT QL REPORTED: YES
GFR SERPLBLD BASED ON 1.73 SQ M-ARVRAT: 64 ML/MIN/1.73M2 (ref 60–?)
GLOBULIN PLAS-MCNC: 3.5 G/DL (ref 2.8–4.4)
GLUCOSE BLD-MCNC: 94 MG/DL (ref 70–99)
HDLC SERPL-MCNC: 66 MG/DL (ref 40–59)
LDLC SERPL CALC-MCNC: 54 MG/DL (ref ?–100)
NONHDLC SERPL-MCNC: 69 MG/DL (ref ?–130)
OSMOLALITY SERPL CALC.SUM OF ELEC: 293 MOSM/KG (ref 275–295)
POTASSIUM SERPL-SCNC: 4.7 MMOL/L (ref 3.5–5.1)
PROT SERPL-MCNC: 7.1 G/DL (ref 6.4–8.2)
PSA FREE MFR SERPL: 17 %
PSA FREE SERPL-MCNC: 0.59 NG/ML
PSA SERPL-MCNC: 3.43 NG/ML (ref ?–4)
SODIUM SERPL-SCNC: 140 MMOL/L (ref 136–145)
TRIGL SERPL-MCNC: 73 MG/DL (ref 30–149)
VLDLC SERPL CALC-MCNC: 11 MG/DL (ref 0–30)

## 2023-02-01 PROCEDURE — 80061 LIPID PANEL: CPT

## 2023-02-01 PROCEDURE — 36415 COLL VENOUS BLD VENIPUNCTURE: CPT

## 2023-02-01 PROCEDURE — 84154 ASSAY OF PSA FREE: CPT

## 2023-02-01 PROCEDURE — 80053 COMPREHEN METABOLIC PANEL: CPT

## 2023-02-01 PROCEDURE — 84153 ASSAY OF PSA TOTAL: CPT

## 2023-04-24 DIAGNOSIS — E78.5 HYPERLIPIDEMIA, UNSPECIFIED HYPERLIPIDEMIA TYPE: ICD-10-CM

## 2023-04-24 RX ORDER — ATORVASTATIN CALCIUM 10 MG/1
10 TABLET, FILM COATED ORAL NIGHTLY
Qty: 90 TABLET | Refills: 0 | Status: SHIPPED | OUTPATIENT
Start: 2023-04-24

## 2023-07-25 DIAGNOSIS — E78.5 HYPERLIPIDEMIA, UNSPECIFIED HYPERLIPIDEMIA TYPE: ICD-10-CM

## 2023-07-25 RX ORDER — ATORVASTATIN CALCIUM 10 MG/1
10 TABLET, FILM COATED ORAL NIGHTLY
Qty: 90 TABLET | Refills: 0 | Status: SHIPPED | OUTPATIENT
Start: 2023-07-25

## 2023-11-08 ENCOUNTER — OFFICE VISIT (OUTPATIENT)
Dept: FAMILY MEDICINE CLINIC | Facility: CLINIC | Age: 63
End: 2023-11-08
Payer: COMMERCIAL

## 2023-11-08 ENCOUNTER — LAB ENCOUNTER (OUTPATIENT)
Dept: LAB | Age: 63
End: 2023-11-08
Attending: FAMILY MEDICINE
Payer: COMMERCIAL

## 2023-11-08 VITALS
RESPIRATION RATE: 16 BRPM | WEIGHT: 164 LBS | DIASTOLIC BLOOD PRESSURE: 82 MMHG | HEART RATE: 78 BPM | SYSTOLIC BLOOD PRESSURE: 118 MMHG | BODY MASS INDEX: 24.02 KG/M2 | HEIGHT: 69.33 IN | OXYGEN SATURATION: 98 %

## 2023-11-08 DIAGNOSIS — Z13.0 SCREENING FOR DEFICIENCY ANEMIA: ICD-10-CM

## 2023-11-08 DIAGNOSIS — Z12.5 PROSTATE CANCER SCREENING: ICD-10-CM

## 2023-11-08 DIAGNOSIS — Z00.00 WELLNESS EXAMINATION: ICD-10-CM

## 2023-11-08 DIAGNOSIS — Z13.220 LIPID SCREENING: ICD-10-CM

## 2023-11-08 DIAGNOSIS — E78.5 HYPERLIPIDEMIA, UNSPECIFIED HYPERLIPIDEMIA TYPE: ICD-10-CM

## 2023-11-08 DIAGNOSIS — I10 ESSENTIAL HYPERTENSION, BENIGN: ICD-10-CM

## 2023-11-08 DIAGNOSIS — Z00.00 WELLNESS EXAMINATION: Primary | ICD-10-CM

## 2023-11-08 LAB
ALBUMIN SERPL-MCNC: 3.4 G/DL (ref 3.4–5)
ALBUMIN/GLOB SERPL: 0.9 {RATIO} (ref 1–2)
ALP LIVER SERPL-CCNC: 44 U/L
ALT SERPL-CCNC: 25 U/L
ANION GAP SERPL CALC-SCNC: 2 MMOL/L (ref 0–18)
AST SERPL-CCNC: 16 U/L (ref 15–37)
BASOPHILS # BLD AUTO: 0.02 X10(3) UL (ref 0–0.2)
BASOPHILS NFR BLD AUTO: 0.4 %
BILIRUB SERPL-MCNC: 0.6 MG/DL (ref 0.1–2)
BUN BLD-MCNC: 26 MG/DL (ref 9–23)
CALCIUM BLD-MCNC: 9.2 MG/DL (ref 8.5–10.1)
CHLORIDE SERPL-SCNC: 109 MMOL/L (ref 98–112)
CHOLEST SERPL-MCNC: 202 MG/DL (ref ?–200)
CO2 SERPL-SCNC: 28 MMOL/L (ref 21–32)
COMPLEXED PSA SERPL-MCNC: 5.01 NG/ML (ref ?–4)
CREAT BLD-MCNC: 1.32 MG/DL
EGFRCR SERPLBLD CKD-EPI 2021: 61 ML/MIN/1.73M2 (ref 60–?)
EOSINOPHIL # BLD AUTO: 0.12 X10(3) UL (ref 0–0.7)
EOSINOPHIL NFR BLD AUTO: 2.6 %
ERYTHROCYTE [DISTWIDTH] IN BLOOD BY AUTOMATED COUNT: 11.5 %
FASTING PATIENT LIPID ANSWER: YES
FASTING STATUS PATIENT QL REPORTED: YES
GLOBULIN PLAS-MCNC: 3.7 G/DL (ref 2.8–4.4)
GLUCOSE BLD-MCNC: 97 MG/DL (ref 70–99)
HCT VFR BLD AUTO: 41.4 %
HDLC SERPL-MCNC: 68 MG/DL (ref 40–59)
HGB BLD-MCNC: 13.9 G/DL
IMM GRANULOCYTES # BLD AUTO: 0.01 X10(3) UL (ref 0–1)
IMM GRANULOCYTES NFR BLD: 0.2 %
LDLC SERPL CALC-MCNC: 117 MG/DL (ref ?–100)
LYMPHOCYTES # BLD AUTO: 1.59 X10(3) UL (ref 1–4)
LYMPHOCYTES NFR BLD AUTO: 34.4 %
MCH RBC QN AUTO: 32.7 PG (ref 26–34)
MCHC RBC AUTO-ENTMCNC: 33.6 G/DL (ref 31–37)
MCV RBC AUTO: 97.4 FL
MONOCYTES # BLD AUTO: 0.33 X10(3) UL (ref 0.1–1)
MONOCYTES NFR BLD AUTO: 7.1 %
NEUTROPHILS # BLD AUTO: 2.55 X10 (3) UL (ref 1.5–7.7)
NEUTROPHILS # BLD AUTO: 2.55 X10(3) UL (ref 1.5–7.7)
NEUTROPHILS NFR BLD AUTO: 55.3 %
NONHDLC SERPL-MCNC: 134 MG/DL (ref ?–130)
OSMOLALITY SERPL CALC.SUM OF ELEC: 293 MOSM/KG (ref 275–295)
PLATELET # BLD AUTO: 224 10(3)UL (ref 150–450)
POTASSIUM SERPL-SCNC: 4.5 MMOL/L (ref 3.5–5.1)
PROT SERPL-MCNC: 7.1 G/DL (ref 6.4–8.2)
RBC # BLD AUTO: 4.25 X10(6)UL
SODIUM SERPL-SCNC: 139 MMOL/L (ref 136–145)
TRIGL SERPL-MCNC: 93 MG/DL (ref 30–149)
VLDLC SERPL CALC-MCNC: 16 MG/DL (ref 0–30)
WBC # BLD AUTO: 4.6 X10(3) UL (ref 4–11)

## 2023-11-08 PROCEDURE — 3008F BODY MASS INDEX DOCD: CPT | Performed by: FAMILY MEDICINE

## 2023-11-08 PROCEDURE — 99396 PREV VISIT EST AGE 40-64: CPT | Performed by: FAMILY MEDICINE

## 2023-11-08 PROCEDURE — 80053 COMPREHEN METABOLIC PANEL: CPT

## 2023-11-08 PROCEDURE — 36415 COLL VENOUS BLD VENIPUNCTURE: CPT

## 2023-11-08 PROCEDURE — 80061 LIPID PANEL: CPT

## 2023-11-08 PROCEDURE — 3079F DIAST BP 80-89 MM HG: CPT | Performed by: FAMILY MEDICINE

## 2023-11-08 PROCEDURE — 85025 COMPLETE CBC W/AUTO DIFF WBC: CPT

## 2023-11-08 PROCEDURE — 3074F SYST BP LT 130 MM HG: CPT | Performed by: FAMILY MEDICINE

## 2023-11-08 RX ORDER — AMLODIPINE BESYLATE AND BENAZEPRIL HYDROCHLORIDE 5; 20 MG/1; MG/1
1 CAPSULE ORAL DAILY
Qty: 90 CAPSULE | Refills: 3 | Status: SHIPPED | OUTPATIENT
Start: 2023-11-08

## 2023-11-08 RX ORDER — PRAVASTATIN SODIUM 20 MG
20 TABLET ORAL NIGHTLY
Qty: 90 TABLET | Refills: 1 | Status: SHIPPED | OUTPATIENT
Start: 2023-11-08

## 2024-01-30 ENCOUNTER — LAB ENCOUNTER (OUTPATIENT)
Dept: LAB | Age: 64
End: 2024-01-30
Attending: FAMILY MEDICINE
Payer: COMMERCIAL

## 2024-01-30 DIAGNOSIS — R97.20 ELEVATED PSA: ICD-10-CM

## 2024-01-30 LAB — COMPLEXED PSA SERPL-MCNC: 6.27 NG/ML (ref ?–4)

## 2024-01-30 PROCEDURE — 36415 COLL VENOUS BLD VENIPUNCTURE: CPT

## 2024-02-05 DIAGNOSIS — R97.20 PSA ELEVATION: Primary | ICD-10-CM

## 2024-03-13 ENCOUNTER — OFFICE VISIT (OUTPATIENT)
Dept: FAMILY MEDICINE CLINIC | Facility: CLINIC | Age: 64
End: 2024-03-13
Payer: COMMERCIAL

## 2024-03-13 ENCOUNTER — LAB ENCOUNTER (OUTPATIENT)
Dept: LAB | Age: 64
End: 2024-03-13
Attending: FAMILY MEDICINE
Payer: COMMERCIAL

## 2024-03-13 VITALS
SYSTOLIC BLOOD PRESSURE: 112 MMHG | HEIGHT: 69.33 IN | RESPIRATION RATE: 16 BRPM | BODY MASS INDEX: 23.43 KG/M2 | DIASTOLIC BLOOD PRESSURE: 76 MMHG | OXYGEN SATURATION: 98 % | HEART RATE: 77 BPM | WEIGHT: 160 LBS

## 2024-03-13 DIAGNOSIS — E78.5 HYPERLIPIDEMIA, UNSPECIFIED HYPERLIPIDEMIA TYPE: Primary | ICD-10-CM

## 2024-03-13 DIAGNOSIS — I10 ESSENTIAL HYPERTENSION, BENIGN: ICD-10-CM

## 2024-03-13 DIAGNOSIS — E78.5 HYPERLIPIDEMIA, UNSPECIFIED HYPERLIPIDEMIA TYPE: ICD-10-CM

## 2024-03-13 LAB
ALBUMIN SERPL-MCNC: 3.8 G/DL (ref 3.4–5)
ALBUMIN/GLOB SERPL: 1.1 {RATIO} (ref 1–2)
ALP LIVER SERPL-CCNC: 52 U/L
ALT SERPL-CCNC: 25 U/L
ANION GAP SERPL CALC-SCNC: 1 MMOL/L (ref 0–18)
AST SERPL-CCNC: 18 U/L (ref 15–37)
BILIRUB SERPL-MCNC: 0.7 MG/DL (ref 0.1–2)
BUN BLD-MCNC: 24 MG/DL (ref 9–23)
CALCIUM BLD-MCNC: 9.1 MG/DL (ref 8.5–10.1)
CHLORIDE SERPL-SCNC: 107 MMOL/L (ref 98–112)
CHOLEST SERPL-MCNC: 179 MG/DL (ref ?–200)
CO2 SERPL-SCNC: 32 MMOL/L (ref 21–32)
CREAT BLD-MCNC: 0.98 MG/DL
EGFRCR SERPLBLD CKD-EPI 2021: 88 ML/MIN/1.73M2 (ref 60–?)
FASTING PATIENT LIPID ANSWER: YES
FASTING STATUS PATIENT QL REPORTED: YES
GLOBULIN PLAS-MCNC: 3.6 G/DL (ref 2.8–4.4)
GLUCOSE BLD-MCNC: 97 MG/DL (ref 70–99)
HDLC SERPL-MCNC: 71 MG/DL (ref 40–59)
LDLC SERPL CALC-MCNC: 94 MG/DL (ref ?–100)
NONHDLC SERPL-MCNC: 108 MG/DL (ref ?–130)
OSMOLALITY SERPL CALC.SUM OF ELEC: 294 MOSM/KG (ref 275–295)
POTASSIUM SERPL-SCNC: 4.2 MMOL/L (ref 3.5–5.1)
PROT SERPL-MCNC: 7.4 G/DL (ref 6.4–8.2)
SODIUM SERPL-SCNC: 140 MMOL/L (ref 136–145)
TRIGL SERPL-MCNC: 78 MG/DL (ref 30–149)
VLDLC SERPL CALC-MCNC: 13 MG/DL (ref 0–30)

## 2024-03-13 PROCEDURE — 99213 OFFICE O/P EST LOW 20 MIN: CPT | Performed by: FAMILY MEDICINE

## 2024-03-13 PROCEDURE — 80061 LIPID PANEL: CPT

## 2024-03-13 PROCEDURE — 36415 COLL VENOUS BLD VENIPUNCTURE: CPT

## 2024-03-13 PROCEDURE — 80053 COMPREHEN METABOLIC PANEL: CPT

## 2024-03-13 NOTE — PROGRESS NOTES
[facility] Progress Note    SUBJECTIVE: Morgan العلي 63 year old male is here today for   Chief Complaint   Patient presents with    Blood Pressure     Pt stopped BP ,meds in January. He has been getting normal readings without meds.        Following up on blood pressure has been able to stop medications. Following at home has been moving into normal range. When high comes down with rest.    Started taking blood pressure medication when he was 24, and now seems odd.    Tolerating the pravastatin.    PMH  Past Medical History:   Diagnosis Date    Essential hypertension, benign 03/20/2013    High cholesterol     Sleep disturbance     Wears glasses         PSH  Past Surgical History:   Procedure Laterality Date    COLONOSCOPY  10/12   10/21/15    darine 5 yr  [place]    COLONOSCOPY      VASECTOMY  7/2006        Social Hx:  No changes    ROS  See HPI    OBJECTIVE:  /76   Pulse 77   Resp 16   Ht 5' 9.33\" (1.761 m)   Wt 160 lb (72.6 kg)   SpO2 98%   BMI 23.40 kg/m²           Labs:          Meds:   Current Outpatient Medications   Medication Sig Dispense Refill    pravastatin 20 MG Oral Tab Take 1 tablet (20 mg total) by mouth nightly. 90 tablet 1    Ascorbic Acid (VITAMIN C) 1000 MG Oral Tab       Cholecalciferol (VITAMIN D) 125 MCG (5000 UT) Oral Cap       Linolenic Acid (OMEGA 3 OR) Take 2,000 mg by mouth daily.      MULTIVITAMIN TAB/CAP Take 1 tablet by mouth daily.           Assessment/Plan  Remberto was seen today for blood pressure.    Diagnoses and all orders for this visit:    Hyperlipidemia, unspecified hyperlipidemia type  -     Comp Metabolic Panel (14) [E]; Future  -     Lipid Panel [E]; Future    Essential hypertension, benign         Can state hypertension resolve, with exercise, lifestyle changes. Will check lipids with new medications.         Total Time spent with patient and coordinating care:  15 minutes.    Follow up: as needed      Oneil Brown MD

## 2024-04-01 ENCOUNTER — OFFICE VISIT (OUTPATIENT)
Dept: SURGERY | Facility: CLINIC | Age: 64
End: 2024-04-01

## 2024-04-01 DIAGNOSIS — R97.20 ELEVATED PSA: Primary | ICD-10-CM

## 2024-04-01 LAB
APPEARANCE: CLEAR
BILIRUBIN: NEGATIVE
GLUCOSE (URINE DIPSTICK): NEGATIVE MG/DL
KETONES (URINE DIPSTICK): NEGATIVE MG/DL
LEUKOCYTES: NEGATIVE
MULTISTIX LOT#: NORMAL NUMERIC
NITRITE, URINE: NEGATIVE
OCCULT BLOOD: NEGATIVE
PH, URINE: 7 (ref 4.5–8)
PROTEIN (URINE DIPSTICK): NEGATIVE MG/DL
SPECIFIC GRAVITY: 1.01 (ref 1–1.03)
URINE-COLOR: YELLOW
UROBILINOGEN,SEMI-QN: 0.2 MG/DL (ref 0–1.9)

## 2024-04-01 PROCEDURE — 99204 OFFICE O/P NEW MOD 45 MIN: CPT | Performed by: SURGERY

## 2024-04-01 PROCEDURE — 81003 URINALYSIS AUTO W/O SCOPE: CPT | Performed by: SURGERY

## 2024-04-01 NOTE — PROGRESS NOTES
Urology Clinic Note - New Patient    Referring Provider:  Oneil Brown MD  [address] [place] [address]  [address]  [place] [address]     Primary Care Provider:  Oneil Brown MD     Chief Complaint:   Elevated PSA    HPI:   Morgan العلي is a 63 year old male with history of hypertension, hyperlipidemia referred for elevated PSA.  His most recent PSA on 1/30/2024 was 6.27, up from 5.01 on 11/8/2023.  His PSA has been elevated above 4 multiple times since 2020.    He denies family history of prostate or other cancers.    He has occasional urinary frequency but drinks a lot of coffee.  Nocturia once per night.  Denies bothersome weak urinary stream.    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    PSA:  Lab Results   Component Value Date    PSA 3.43 02/01/2023    PSA 1.850 02/28/2017    PSA 1.650 12/02/2015    PSA 1.280 10/08/2014    PSA 1.450 10/02/2013    PSA 1.00 08/29/2012    PSAS 6.27 (H) 01/30/2024    PSAS 5.01 (H) 11/08/2023    PSAS 5.50 (H) 10/19/2022    PSAS 4.61 (H) 10/06/2021    PSAS 4.17 (H) 10/07/2020    PSAS 5.51 (H) 06/16/2020    PSAS 2.40 10/24/2018      AUA symptom score is 9/0 with LUTS.    Urinalysis (clinic dip): Negative    History:     Past Medical History:   Diagnosis Date    Essential hypertension, benign 03/20/2013    High cholesterol     Sleep disturbance     Wears glasses        Past Surgical History:   Procedure Laterality Date    COLONOSCOPY  10/12   10/21/15    darien 7 yr  [place]    COLONOSCOPY      VASECTOMY  7/2006       Family History   Problem Relation Age of Onset    Hypertension Mother     Stroke Mother     Mental Disorder Mother         Dementia    Other (chronic sinusitis) Mother     Other (HTN) Mother        Social History     Socioeconomic History    Marital status:    Tobacco Use    Smoking status: Never    Smokeless tobacco: Never   Substance and Sexual Activity    Alcohol use: Not Currently     Alcohol/week: 6.0 standard drinks of alcohol     Types: 6 Standard drinks or  equivalent per week    Drug use: No   Other Topics Concern    Caffeine Concern Yes     Comment: 4 cups of coffee daily.     Exercise Yes     Comment: 3-4x/week.       Medications (Active prior to today's visit):  Current Outpatient Medications   Medication Sig Dispense Refill    Ascorbic Acid (VITAMIN C) 1000 MG Oral Tab       Cholecalciferol (VITAMIN D) 125 MCG (5000 UT) Oral Cap       Linolenic Acid (OMEGA 3 OR) Take 2,000 mg by mouth daily.      MULTIVITAMIN TAB/CAP Take 1 tablet by mouth daily.         Allergies:  Allergies   Allergen Reactions    Codeine RASH     Derivatives         Review of Systems:   A comprehensive 10-point review of systems was completed.  Pertinent positives and negatives are noted in the the HPI.    Physical Exam:   CONSTITUTIONAL: Well developed, well nourished, in no acute distress  NEUROLOGIC: Alert and oriented  HEAD: Normocephalic, atraumatic  EYES: Sclera non-icteric  ENT: Hearing intact, moist mucous membranes  NECK: No obvious goiter or masses  RESPIRATORY: Normal respiratory effort  SKIN: No evident rashes  ABDOMEN: Soft, non-tender, non-distended  DIGITAL RECTAL EXAM: ~50 gram prostate, no nodules or tenderness    Assessment & Plan:   Morgan العلي is a 63 year old male with history of hypertension, hyperlipidemia referred for elevated PSA.  His most recent PSA on 1/30/2024 was 6.27, up from 5.01 on 11/8/2023.  His PSA has been elevated above 4 multiple times since 2020.    He denies family history of prostate or other cancers.    He has occasional urinary frequency but drinks a lot of coffee.  Nocturia once per night.  Denies bothersome weak urinary stream.    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    I discussed PSA screening in detail.  PSA screening is our best tool to detect prostate cancer, as prostate cancer typically has no other signs or symptoms.  I mentioned that prostate cells produce PSA that gets absorbed into the blood.  I discussed that multiple things  besides prostate cancer can elevate the PSA, however.  These include prostate enlargement, prostate infection or inflammation, recent ejaculation, recent cycling or prostate manipulation, recent urologic procedure or Loera catheter placement.  Because of this, we typically do not act on a single elevated PSA and look more at trends in the PSA.    Discussed possible additional workup 4K score versus prostate MRI versus prostate biopsy.  After discussion we will proceed with prostate MRI.    -Prostate MRI    Thank you for this consult.    I have personally reviewed all relevant medical records, labs, and imaging.    Medical Decision Making  Elevated PSA: Undiagnosed new problem    Amount and/or Complexity of Data Reviewed  External Data Reviewed: labs and notes.     Details: PSA x 10  Radiology: ordered.        Torito Donis MD  Staff Urologist  [facility]  Office: [phone]

## 2024-04-02 ENCOUNTER — PATIENT MESSAGE (OUTPATIENT)
Dept: SURGERY | Facility: CLINIC | Age: 64
End: 2024-04-02

## 2024-04-03 ENCOUNTER — TELEPHONE (OUTPATIENT)
Dept: SURGERY | Facility: CLINIC | Age: 64
End: 2024-04-03

## 2024-04-03 NOTE — TELEPHONE ENCOUNTER
This encounter is now closed.     RN called patient to offer 4/19 Friday at 8AM telephone visit to discuss MRI result. Requested to drop CD copy at office for review on Thursday.     Reminded to cancel 5/21 MRI at [facility] and 5/30 appt with MD cancelled.

## 2024-04-03 NOTE — TELEPHONE ENCOUNTER
Per pt was able to get MRI appt on 4/16 at an outside facility, pt currently has phone visit scheduled on 5/30 to discuss results, pt asking for phone visit this month, no openings available. Pt would like call instead of mychart. Please call thank you.

## 2024-04-16 ENCOUNTER — TELEPHONE (OUTPATIENT)
Dept: SURGERY | Facility: CLINIC | Age: 64
End: 2024-04-16

## 2024-04-19 ENCOUNTER — VIRTUAL PHONE E/M (OUTPATIENT)
Dept: SURGERY | Facility: CLINIC | Age: 64
End: 2024-04-19
Payer: COMMERCIAL

## 2024-04-19 ENCOUNTER — TELEPHONE (OUTPATIENT)
Dept: SURGERY | Facility: CLINIC | Age: 64
End: 2024-04-19

## 2024-04-19 DIAGNOSIS — R97.20 ELEVATED PSA: Primary | ICD-10-CM

## 2024-04-19 PROCEDURE — 99441 PHONE E/M BY PHYS 5-10 MIN: CPT | Performed by: SURGERY

## 2024-04-19 NOTE — PROGRESS NOTES
Urology Clinic Note  Telemedicine Visit  Audio Only  The patient consented to performing this visit virtually.     Primary Care Provider:  Oneil Brown MD     Chief Complaint:   Elevated PSA     HPI:   Morgan العلي is a 63 year old male with history of hypertension, hyperlipidemia referred for elevated PSA.  His most recent PSA on 1/30/2024 was 6.27, up from 5.01 on 11/8/2023.  His PSA has been elevated above 4 multiple times since 2020.     He denies family history of prostate or other cancers.     He has occasional urinary frequency but drinks a lot of coffee.  Nocturia once per night.  Denies bothersome weak urinary stream.    Prostate MRI 4/16/24 shows volume 33 cc, PIRADS 2 study without suspicious lesions.     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    PSA:  Lab Results   Component Value Date    PSA 3.43 02/01/2023    PSA 1.850 02/28/2017    PSA 1.650 12/02/2015    PSA 1.280 10/08/2014    PSA 1.450 10/02/2013    PSA 1.00 08/29/2012    PSAS 6.27 (H) 01/30/2024    PSAS 5.01 (H) 11/08/2023    PSAS 5.50 (H) 10/19/2022    PSAS 4.61 (H) 10/06/2021    PSAS 4.17 (H) 10/07/2020    PSAS 5.51 (H) 06/16/2020    PSAS 2.40 10/24/2018        History:     Past Medical History:    Essential hypertension, benign    High cholesterol    Sleep disturbance    Wears glasses       Past Surgical History:   Procedure Laterality Date    Colonoscopy  10/12   10/21/15    darien 5 yr  [place]    Colonoscopy      Vasectomy  7/2006       Family History   Problem Relation Age of Onset    Hypertension Mother     Stroke Mother     Mental Disorder Mother         Dementia    Other (chronic sinusitis) Mother     Other (HTN) Mother        Social History     Socioeconomic History    Marital status:    Tobacco Use    Smoking status: Never    Smokeless tobacco: Never   Substance and Sexual Activity    Alcohol use: Not Currently     Alcohol/week: 6.0 standard drinks of alcohol     Types: 6 Standard drinks or equivalent per week    Drug use:  No   Other Topics Concern    Caffeine Concern Yes     Comment: 4 cups of coffee daily.     Exercise Yes     Comment: 3-4x/week.       Medications (Active prior to today's visit):  Current Outpatient Medications   Medication Sig Dispense Refill    Ascorbic Acid (VITAMIN C) 1000 MG Oral Tab       Cholecalciferol (VITAMIN D) 125 MCG (5000 UT) Oral Cap       Linolenic Acid (OMEGA 3 OR) Take 2,000 mg by mouth daily.      MULTIVITAMIN TAB/CAP Take 1 tablet by mouth daily.         Allergies:  Allergies   Allergen Reactions    Codeine RASH     Derivatives         Review of Systems:   A comprehensive 10-point review of systems was completed.  Pertinent positives and negatives are noted in the the HPI.    Physical Exam:   No physical exam performed during this telephone encounter.    Assessment & Plan:   Morgan العلي is a 63 year old male with history of hypertension, hyperlipidemia referred for elevated PSA.  His most recent PSA on 1/30/2024 was 6.27, up from 5.01 on 11/8/2023.  His PSA has been elevated above 4 multiple times since 2020.     He denies family history of prostate or other cancers.     He has occasional urinary frequency but drinks a lot of coffee.  Nocturia once per night.  Denies bothersome weak urinary stream.    Prostate MRI 4/16/24 shows volume 33 cc, PIRADS 2 study without suspicious lesions.    -Discussed options of repeat PSA in 6 months versus prostate biopsy, he elects to proceed with repeat PSA/free PSA in 6 months    In total, 5 minutes were spent on this patient encounter for medical discussion.    Torito Donis MD  Staff Urologist  [facility]  Office: [phone]

## 2024-04-19 NOTE — TELEPHONE ENCOUNTER
Patient has a 8am virtual visit with Dr. Donis today. Patient states they may not be able to answer call later than scheduled time as they also have patients. Patient states if call is missed the doctor may leave a detailed messaged on his answering machine. Please advise.

## 2024-05-02 ENCOUNTER — PATIENT MESSAGE (OUTPATIENT)
Dept: FAMILY MEDICINE CLINIC | Facility: CLINIC | Age: 64
End: 2024-05-02

## 2024-05-02 DIAGNOSIS — E78.5 HYPERLIPIDEMIA, UNSPECIFIED HYPERLIPIDEMIA TYPE: ICD-10-CM

## 2024-05-02 RX ORDER — PRAVASTATIN SODIUM 20 MG
20 TABLET ORAL NIGHTLY
Qty: 90 TABLET | Refills: 1 | OUTPATIENT
Start: 2024-05-02

## 2024-05-02 NOTE — TELEPHONE ENCOUNTER
From: Morgan العلي  To: Oneil Brown  Sent: 5/2/2024 3:07 PM CDT  Subject: Refill pravastatin    Please refill my Rx for pravastatin.  Thanks

## 2024-05-03 RX ORDER — PRAVASTATIN SODIUM 20 MG
20 TABLET ORAL NIGHTLY
Qty: 90 TABLET | Refills: 1 | Status: SHIPPED | OUTPATIENT
Start: 2024-05-03

## 2024-08-26 LAB — AMB EXT COVID-19 RESULT: DETECTED

## 2024-08-27 ENCOUNTER — E-VISIT (OUTPATIENT)
Dept: TELEHEALTH | Age: 64
End: 2024-08-27
Payer: COMMERCIAL

## 2024-08-27 DIAGNOSIS — U07.1 COVID: Primary | ICD-10-CM

## 2024-08-27 PROCEDURE — 99422 OL DIG E/M SVC 11-20 MIN: CPT | Performed by: PHYSICIAN ASSISTANT

## 2024-08-27 NOTE — PATIENT INSTRUCTIONS
HOLD/DO NOT TAKE PRAVASTATIN FOR NEXT 7 days      When you may have a respiratory virus...?  Stay home and away from others (including people you live with who are not sick) if you have respiratory virus symptoms that aren't better explained by another cause. These symptoms can include fever, chills, fatigue, cough, runny nose, and headache, among others.    You can go back to your normal activities when, for at least 24 hours, both are true:  Your symptoms are getting better overall, and  2. You have not had a fever (and are not using fever-reducing medication).    When you go back to your normal activities, take added precaution over the next 5 days, such as taking additional steps for  air, hygiene, masks, physical distancing, and/or testing when you will be around other people indoors.    Keep in mind that you may still be able to spread the virus that made you sick, even if you are feeling better. You are likely to be less contagious at this time, depending on factors like how long you were sick or how sick you were.    If you develop a fever or you start to feel worse after you have gone back to normal activities, stay home and away from others again until, for at least 24 hours, both are true: your symptoms are improving overall, and you have not had a fever (and are not using fever-reducing medication). Then take added precaution for the next 5 days.       Rest as much as possible   Drink lots of fluids-- aim to drink at least 60 ounces of water in addition to electrolyte replacement drinks.   You may take Ibuprofen 600 mg every 6 hours with a little food and you may add Acetaminophen 650 mg every 4 hours as needed for fever control and body aches.   Throat lozenges or hard candies to soothe sore throat and lessen cough   1 tsp of honey as needed to help coat throat and suppress cough   Warm salt water gargles to sooth sore throat   You can use your preferred OTC cold/cough medication just be sure to  check the ingredients so you are not taking too much acetaminophen or ibuprofen.   If you have significant nasal congestion and no history of blood pressure issues you can take Sudafed (pseudoephedrine) 4-6 hours formulation behind the pharmacy counter

## 2024-08-27 NOTE — PROGRESS NOTES
Morgan العلي is a 63 year old male who initiated e-visit care today.    HPI:   See answers to questionnaire submission     Current Outpatient Medications   Medication Sig Dispense Refill    pravastatin 20 MG Oral Tab Take 1 tablet (20 mg total) by mouth nightly. 90 tablet 1    Cholecalciferol (VITAMIN D) 125 MCG (5000 UT) Oral Cap       Linolenic Acid (OMEGA 3 OR) Take 2,000 mg by mouth daily.      MULTIVITAMIN TAB/CAP Take 1 tablet by mouth daily.        Past Medical History:    Essential hypertension, benign    High cholesterol    Sleep disturbance    Wears glasses      Past Surgical History:   Procedure Laterality Date    Colonoscopy  10/12   10/21/15    darien 5 yr  [place]    Colonoscopy      Vasectomy  7/2006      Family History   Problem Relation Age of Onset    Hypertension Mother     Stroke Mother     Mental Disorder Mother         Dementia    Other (chronic sinusitis) Mother     Other (HTN) Mother       Social History:  Social History     Socioeconomic History    Marital status:    Tobacco Use    Smoking status: Never    Smokeless tobacco: Never   Substance and Sexual Activity    Alcohol use: Not Currently     Alcohol/week: 6.0 standard drinks of alcohol     Types: 6 Standard drinks or equivalent per week    Drug use: No   Other Topics Concern    Caffeine Concern Yes     Comment: 4 cups of coffee daily.     Exercise Yes     Comment: 3-4x/week.         ASSESSMENT AND PLAN:       Diagnoses and all orders for this visit:    COVID  -     nirmatrelvir-ritonavir 300-100 MG Oral Tablet Therapy Pack; Take two nirmatrelvir tablets (300mg) with one ritonavir tablet (100mg) together twice daily for 5 days.      The patient has been deemed a candidate for Paxlovid.  Symptoms began 8/24/24.  Patient does not require hospitalization.   Patient has the following risks factors age.    Labs reveal no significant history of liver or kidney problems/conditions.   Per [facility] drug interaction :  pravastatin      Discussed use, dose, and possible side effects.    The patient agrees to treatment with Paxlovid.   Advised to continue to self-isolate and use infection control measures according to CDC guidelines.           Duration of  the service:  18 minutes      See [facility] message exchange and Patient Instructions for Comfort Care and patient education.

## 2024-11-02 DIAGNOSIS — E78.5 HYPERLIPIDEMIA, UNSPECIFIED HYPERLIPIDEMIA TYPE: ICD-10-CM

## 2024-11-04 RX ORDER — PRAVASTATIN SODIUM 20 MG
20 TABLET ORAL NIGHTLY
Qty: 90 TABLET | Refills: 1 | Status: SHIPPED | OUTPATIENT
Start: 2024-11-04

## 2024-12-11 ENCOUNTER — OFFICE VISIT (OUTPATIENT)
Dept: FAMILY MEDICINE CLINIC | Facility: CLINIC | Age: 64
End: 2024-12-11
Payer: COMMERCIAL

## 2024-12-11 ENCOUNTER — LAB ENCOUNTER (OUTPATIENT)
Dept: LAB | Age: 64
End: 2024-12-11
Attending: SURGERY
Payer: COMMERCIAL

## 2024-12-11 ENCOUNTER — LAB ENCOUNTER (OUTPATIENT)
Dept: LAB | Age: 64
End: 2024-12-11
Attending: FAMILY MEDICINE
Payer: COMMERCIAL

## 2024-12-11 VITALS — WEIGHT: 166 LBS | HEIGHT: 69.25 IN | RESPIRATION RATE: 16 BRPM | BODY MASS INDEX: 24.31 KG/M2

## 2024-12-11 DIAGNOSIS — Z13.0 SCREENING FOR DEFICIENCY ANEMIA: ICD-10-CM

## 2024-12-11 DIAGNOSIS — Z00.00 WELLNESS EXAMINATION: Primary | ICD-10-CM

## 2024-12-11 DIAGNOSIS — R97.20 ELEVATED PSA: ICD-10-CM

## 2024-12-11 DIAGNOSIS — Z00.00 WELLNESS EXAMINATION: ICD-10-CM

## 2024-12-11 DIAGNOSIS — Z13.220 LIPID SCREENING: ICD-10-CM

## 2024-12-11 LAB
ALBUMIN SERPL-MCNC: 4.2 G/DL (ref 3.2–4.8)
ALBUMIN/GLOB SERPL: 1.4 {RATIO} (ref 1–2)
ALP LIVER SERPL-CCNC: 39 U/L
ALT SERPL-CCNC: 21 U/L
ANION GAP SERPL CALC-SCNC: 6 MMOL/L (ref 0–18)
AST SERPL-CCNC: 25 U/L (ref ?–34)
BASOPHILS # BLD AUTO: 0.03 X10(3) UL (ref 0–0.2)
BASOPHILS NFR BLD AUTO: 0.6 %
BILIRUB SERPL-MCNC: 0.7 MG/DL (ref 0.2–1.1)
BUN BLD-MCNC: 20 MG/DL (ref 9–23)
CALCIUM BLD-MCNC: 9.6 MG/DL (ref 8.7–10.4)
CHLORIDE SERPL-SCNC: 104 MMOL/L (ref 98–112)
CHOLEST SERPL-MCNC: 174 MG/DL (ref ?–200)
CO2 SERPL-SCNC: 31 MMOL/L (ref 21–32)
CREAT BLD-MCNC: 1.17 MG/DL
EGFRCR SERPLBLD CKD-EPI 2021: 70 ML/MIN/1.73M2 (ref 60–?)
EOSINOPHIL # BLD AUTO: 0.18 X10(3) UL (ref 0–0.7)
EOSINOPHIL NFR BLD AUTO: 3.6 %
ERYTHROCYTE [DISTWIDTH] IN BLOOD BY AUTOMATED COUNT: 11.9 %
FASTING PATIENT LIPID ANSWER: YES
FASTING STATUS PATIENT QL REPORTED: YES
GLOBULIN PLAS-MCNC: 2.9 G/DL (ref 2–3.5)
GLUCOSE BLD-MCNC: 102 MG/DL (ref 70–99)
HCT VFR BLD AUTO: 43.1 %
HDLC SERPL-MCNC: 60 MG/DL (ref 40–59)
HGB BLD-MCNC: 14.7 G/DL
IMM GRANULOCYTES # BLD AUTO: 0.01 X10(3) UL (ref 0–1)
IMM GRANULOCYTES NFR BLD: 0.2 %
LDLC SERPL CALC-MCNC: 100 MG/DL (ref ?–100)
LYMPHOCYTES # BLD AUTO: 1.56 X10(3) UL (ref 1–4)
LYMPHOCYTES NFR BLD AUTO: 31.3 %
MCH RBC QN AUTO: 32.7 PG (ref 26–34)
MCHC RBC AUTO-ENTMCNC: 34.1 G/DL (ref 31–37)
MCV RBC AUTO: 96 FL
MONOCYTES # BLD AUTO: 0.37 X10(3) UL (ref 0.1–1)
MONOCYTES NFR BLD AUTO: 7.4 %
NEUTROPHILS # BLD AUTO: 2.83 X10 (3) UL (ref 1.5–7.7)
NEUTROPHILS # BLD AUTO: 2.83 X10(3) UL (ref 1.5–7.7)
NEUTROPHILS NFR BLD AUTO: 56.9 %
NONHDLC SERPL-MCNC: 114 MG/DL (ref ?–130)
OSMOLALITY SERPL CALC.SUM OF ELEC: 295 MOSM/KG (ref 275–295)
PLATELET # BLD AUTO: 228 10(3)UL (ref 150–450)
POTASSIUM SERPL-SCNC: 4.9 MMOL/L (ref 3.5–5.1)
PROT SERPL-MCNC: 7.1 G/DL (ref 5.7–8.2)
RBC # BLD AUTO: 4.49 X10(6)UL
SODIUM SERPL-SCNC: 141 MMOL/L (ref 136–145)
TRIGL SERPL-MCNC: 74 MG/DL (ref 30–149)
VLDLC SERPL CALC-MCNC: 12 MG/DL (ref 0–30)
WBC # BLD AUTO: 5 X10(3) UL (ref 4–11)

## 2024-12-11 PROCEDURE — 99396 PREV VISIT EST AGE 40-64: CPT | Performed by: FAMILY MEDICINE

## 2024-12-11 PROCEDURE — 36415 COLL VENOUS BLD VENIPUNCTURE: CPT

## 2024-12-11 PROCEDURE — 84154 ASSAY OF PSA FREE: CPT

## 2024-12-11 PROCEDURE — 80053 COMPREHEN METABOLIC PANEL: CPT

## 2024-12-11 PROCEDURE — 80061 LIPID PANEL: CPT

## 2024-12-11 PROCEDURE — 84153 ASSAY OF PSA TOTAL: CPT

## 2024-12-11 PROCEDURE — 85025 COMPLETE CBC W/AUTO DIFF WBC: CPT

## 2024-12-11 NOTE — PROGRESS NOTES
HPI:   Morgan العلي is a 64 year old male who presents for an Annual Health Visit.     No major changes in life or health.    Has a regular exercise regimen.    Allergies:     Allergies   Allergen Reactions    Codeine RASH     Derivatives         CURRENT MEDICATIONS   Current Outpatient Medications   Medication Sig Dispense Refill    PRAVASTATIN 20 MG Oral Tab TAKE 1 TABLET(20 MG) BY MOUTH EVERY NIGHT 90 tablet 1    Linolenic Acid (OMEGA 3 OR) Take 2,000 mg by mouth daily.      MULTIVITAMIN TAB/CAP Take 1 tablet by mouth daily.      Cholecalciferol (VITAMIN D) 125 MCG (5000 UT) Oral Cap         HISTORICAL INFORMATION   Past Medical History:    Essential hypertension, benign    High cholesterol    Sleep disturbance    Wears glasses      Past Surgical History:   Procedure Laterality Date    Colonoscopy  10/12   10/21/15    darien 7 yr  [place]    Colonoscopy      Vasectomy  7/2006      Family History   Problem Relation Age of Onset    Hypertension Mother     Stroke Mother     Mental Disorder Mother         Dementia    Other (chronic sinusitis) Mother     Other (HTN) Mother       SOCIAL HISTORY   Social History     Socioeconomic History    Marital status:    Tobacco Use    Smoking status: Never    Smokeless tobacco: Never   Substance and Sexual Activity    Alcohol use: Not Currently     Alcohol/week: 6.0 standard drinks of alcohol     Types: 6 Standard drinks or equivalent per week    Drug use: No   Other Topics Concern    Caffeine Concern Yes     Comment: 4 cups of coffee daily.     Exercise Yes     Comment: 3-4x/week.     Social History     Social History Narrative    Not on file        REVIEW OF SYSTEMS:     Constitutional: negative  Eyes: negative  ENT: negative  Respiratory: negative  Cardiovascular: negative  Gastrointestinal: negative  Integument/Breast: negative  Genitourinary:  nocturia x 1  Heme/Lymph: negative  Musculoskeletal: negative  Neurological: negative  Psych: negative  Endocrine:  negative  Allergic/Immune: negative    EXAM:   Resp 16   Ht 5' 9.25\" (1.759 m)   Wt 166 lb (75.3 kg)   BMI 24.34 kg/m²    Wt Readings from Last 6 Encounters:   12/11/24 166 lb (75.3 kg)   03/13/24 160 lb (72.6 kg)   11/14/23 162 lb (73.5 kg)   11/08/23 164 lb (74.4 kg)   10/19/22 160 lb (72.6 kg)   10/06/21 169 lb 6.4 oz (76.8 kg)     Body mass index is 24.34 kg/m².    General: alert, appears stated age, and cooperative  Head: Normocephalic, without obvious abnormality, atraumatic  Eyes: conjunctivae/corneas clear. PERRL, EOM's intact. Fundi benign.  Ears: normal TM's and external ear canals both ears  Nose: Nares normal. Septum midline. Mucosa normal. No drainage or sinus tenderness.  Throat: lips, mucosa, and tongue normal; teeth and gums normal  Neck: no adenopathy, no carotid bruit, no JVD, supple, symmetrical, trachea midline, and thyroid not enlarged, symmetric, no tenderness/mass/nodules  Heart: S1, S2 normal, no murmur, click, rub or gallop, regular rate and rhythm  Lungs: clear to auscultation bilaterally  Chest wall: no tenderness  Abdomen: soft, non-tender; bowel sounds normal; no masses,  no organomegaly  : deferred  Back: symmetric, no curvature. ROM normal. No CVA tenderness.  Extremities: extremities normal, atraumatic, no cyanosis or edema  Pulses: 2+ and symmetric  Skin: Skin color, texture, turgor normal. No rashes or lesions  Lymph Nodes: Cervical, supraclavicular, and axillary nodes normal.  Neurologic: Grossly normal    ASSESSMENT AND PLAN:   Remberto was seen today for physical.    Diagnoses and all orders for this visit:    Wellness examination  -     Comp Metabolic Panel (14); Future  -     CBC With Differential With Platelet; Future  -     Lipid Panel; Future    Lipid screening  -     Lipid Panel; Future    Screening for deficiency anemia  -     CBC With Differential With Platelet; Future    Overall doing well, no concerns will get screening labs  There are no Patient Instructions on file  for this visit.    The patient indicates understanding of these issues and agrees to the plan.    Problem List:  Patient Active Problem List   Diagnosis    Vitamin D deficiency    Disorders of bursae and tendons in shoulder region, unspecified    History of adenomatous polyp of colon    Benign neoplasm of transverse colon       Oneil Brown MD  12/11/2024  8:10 AM

## 2024-12-12 ENCOUNTER — TELEPHONE (OUTPATIENT)
Dept: SURGERY | Facility: CLINIC | Age: 64
End: 2024-12-12

## 2024-12-12 LAB
% FREE PSA: 19.8 %
% FREE PSA: 19.8 %
PROSTATE SPECIFIC AG: 4.2 NG/ML
PROSTATE SPECIFIC AG: 4.2 NG/ML
PSA, FREE: 0.83 NG/ML
PSA, FREE: 0.83 NG/ML

## 2024-12-12 NOTE — TELEPHONE ENCOUNTER
----- Message from Liz Andrade sent at 12/12/2024  1:11 PM CST -----  Recommend OV with MB to review.    Lab Results       Component                Value               Date/Time                  PSA                      4.2 (H)             12/11/2024 08:38 AM        PSA                      3.43                02/01/2023 07:18 AM        PSA                      1.850               02/28/2017 01:09 PM        PSA                      1.650               12/02/2015 07:43 AM        PSA                      1.280               10/08/2014 09:18 AM        PSA                      1.450               10/02/2013 09:10 AM        PSA                      1.00                08/29/2012 10:48 AM     No results found for: \"PERCENTPSA\"    Lab Results       Component                Value               Date                       PSAS                     6.27 (H)            01/30/2024                 PSAS                     5.01 (H)            11/08/2023                 PSAS                     5.50 (H)            10/19/2022                 PSAS                     4.61 (H)            10/06/2021                 PSAS                     4.17 (H)            10/07/2020                 PSAS                     5.51 (H)            06/16/2020                 PSAS                     2.40                10/24/2018              No results found for: \"PSAULTRA\"

## 2024-12-23 ENCOUNTER — TELEMEDICINE (OUTPATIENT)
Dept: SURGERY | Facility: CLINIC | Age: 64
End: 2024-12-23
Payer: COMMERCIAL

## 2024-12-23 DIAGNOSIS — R97.20 ELEVATED PSA: Primary | ICD-10-CM

## 2024-12-23 PROCEDURE — 99214 OFFICE O/P EST MOD 30 MIN: CPT | Performed by: SURGERY

## 2024-12-23 NOTE — PROGRESS NOTES
Urology Clinic Note  Telemedicine Visit  Audio & Video  The patient consented to performing this visit virtually.    Primary Care Provider:  Oneil Brown MD     Chief Complaint:   Elevated PSA    HPI & Assessment:   Morgan العلي is a 64 year old male with history of hypertension, hyperlipidemia referred for elevated PSA.  His most recent PSA on 1/30/2024 was 6.27, up from 5.01 on 11/8/2023.  His PSA has been elevated above 4 multiple times since 2020.     He denies family history of prostate or other cancers.     He has occasional urinary frequency but drinks a lot of coffee.  Nocturia once per night.  Denies bothersome weak urinary stream.    Prostate MRI 4/16/24 shows volume 33 cc, PIRADS 2 study without suspicious lesions.     Repeat PSA 12/11/2024 was up to 4.2 (19.8% free).    We had a discussion today about options of prostate biopsy versus repeat PSA.  He elects to proceed with repeat PSA in 6 months.  If he changes his mind and would like to go with prostate biopsy will let us know.    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    Plan:   -Return to clinic in 6 months with PSA prior       PSA:  Lab Results   Component Value Date    PSA 4.2 (H) 12/11/2024    PSA 3.43 02/01/2023    PSA 1.850 02/28/2017    PSA 1.650 12/02/2015    PSA 1.280 10/08/2014    PSA 1.450 10/02/2013    PSA 1.00 08/29/2012    PSAS 6.27 (H) 01/30/2024    PSAS 5.01 (H) 11/08/2023    PSAS 5.50 (H) 10/19/2022    PSAS 4.61 (H) 10/06/2021    PSAS 4.17 (H) 10/07/2020    PSAS 5.51 (H) 06/16/2020    PSAS 2.40 10/24/2018        History:     Past Medical History:    Essential hypertension, benign    High cholesterol    Sleep disturbance    Wears glasses       Past Surgical History:   Procedure Laterality Date    Colonoscopy  10/12   10/21/15    darien 7 yr  [place]    Colonoscopy      Vasectomy  7/2006       Family History   Problem Relation Age of Onset    Hypertension Mother     Stroke Mother     Mental Disorder Mother         Dementia    Other  (chronic sinusitis) Mother     Other (HTN) Mother        Social History     Socioeconomic History    Marital status:    Tobacco Use    Smoking status: Never    Smokeless tobacco: Never   Vaping Use    Vaping status: Never Used   Substance and Sexual Activity    Alcohol use: Not Currently     Alcohol/week: 6.0 standard drinks of alcohol     Types: 6 Standard drinks or equivalent per week    Drug use: No   Other Topics Concern    Caffeine Concern Yes     Comment: 4 cups of coffee daily.     Exercise Yes     Comment: 3-4x/week.       Medications (Active prior to today's visit):  Current Outpatient Medications   Medication Sig Dispense Refill    PRAVASTATIN 20 MG Oral Tab TAKE 1 TABLET(20 MG) BY MOUTH EVERY NIGHT 90 tablet 1    Cholecalciferol (VITAMIN D) 125 MCG (5000 UT) Oral Cap       Linolenic Acid (OMEGA 3 OR) Take 2,000 mg by mouth daily.      MULTIVITAMIN TAB/CAP Take 1 tablet by mouth daily.         Allergies:  Allergies[1]    Review of Systems:   A comprehensive 10-point review of systems was completed.  Pertinent positives and negatives are noted in the the HPI.    Physical Exam:   CONSTITUTIONAL: Well developed, well nourished, in no acute distress  NEUROLOGIC: Alert and oriented, normal speech  EYES: Sclera non-icteric  HEAD: Normocephalic, atraumatic  ENT: Hearing intact  RESPIRATORY: Normal respiratory effort      In total, 30 minutes were spent on this patient encounter (including chart review, patient history, physical, and counseling, documentation, and communication).    Torito Donis MD  Staff Urologist  [facility]  Office: [phone]             [1]   Allergies  Allergen Reactions    Codeine RASH     Derivatives

## 2025-04-09 ENCOUNTER — TELEPHONE (OUTPATIENT)
Dept: SURGERY | Facility: CLINIC | Age: 65
End: 2025-04-09

## 2025-04-09 NOTE — TELEPHONE ENCOUNTER
This encounter is now closed.     Last MRI on 4/16/24  Last seen by Dr Donis on 12/23/24:       Plan:   -Return to clinic in 6 months with PSA prior

## 2025-04-09 NOTE — TELEPHONE ENCOUNTER
leonela from [facility] called.  Pt is scheduled for the mri of the prostate.  Looking for the order, office visit notes, psa and biopsy.  Fax to [phone]

## 2025-04-09 NOTE — TELEPHONE ENCOUNTER
This encounter is now closed.     RN updated [facility]   No MRI order per PA, only PSA lab draw

## 2025-05-05 DIAGNOSIS — E78.5 HYPERLIPIDEMIA, UNSPECIFIED HYPERLIPIDEMIA TYPE: ICD-10-CM

## 2025-05-05 RX ORDER — PRAVASTATIN SODIUM 20 MG
20 TABLET ORAL NIGHTLY
Qty: 90 TABLET | Refills: 1 | Status: SHIPPED | OUTPATIENT
Start: 2025-05-05

## 2025-08-15 ENCOUNTER — LAB ENCOUNTER (OUTPATIENT)
Dept: LAB | Age: 65
End: 2025-08-15
Attending: SURGERY

## 2025-08-15 ENCOUNTER — RESULTS FOLLOW-UP (OUTPATIENT)
Dept: SURGERY | Facility: CLINIC | Age: 65
End: 2025-08-15

## 2025-08-15 DIAGNOSIS — R97.20 ELEVATED PSA: ICD-10-CM

## 2025-08-15 LAB — PSA SERPL-MCNC: 4.08 NG/ML (ref ?–4)

## 2025-08-15 PROCEDURE — 36415 COLL VENOUS BLD VENIPUNCTURE: CPT

## 2025-08-15 PROCEDURE — 84153 ASSAY OF PSA TOTAL: CPT

## (undated) NOTE — MR AVS SNAPSHOT
[facility]  [address] [address]  [address] [facility] [address] [phone]               Thank you for choosing us for your health care visit with Ale Munguia NP.   We are glad to serve you and happy to provide you with this sum Today's Vital Signs     BP Pulse Temp Weight          116/68 mmHg 59 97.8 °F (36.6 °C) (Oral) 178 lb 12 oz           Current Medications          This list is accurate as of: 2/28/17  1:02 PM.  Always use your most recent med list. Make half your plate fruits and vegetables Highly refined, white starches including white bread, rice and pasta   Eat plenty of protein, keep the fat content low Sugars:  sodas and sports drinks, candies and desserts   Eat plenty of low-fat dairy products